# Patient Record
Sex: FEMALE | Race: WHITE | Employment: UNEMPLOYED | URBAN - METROPOLITAN AREA
[De-identification: names, ages, dates, MRNs, and addresses within clinical notes are randomized per-mention and may not be internally consistent; named-entity substitution may affect disease eponyms.]

---

## 2019-04-23 ENCOUNTER — HOSPITAL ENCOUNTER (OUTPATIENT)
Dept: BONE DENSITY | Age: 64
Discharge: HOME | End: 2019-04-23
Attending: FAMILY MEDICINE
Payer: COMMERCIAL

## 2019-04-23 ENCOUNTER — HOSPITAL ENCOUNTER (OUTPATIENT)
Dept: MAMMOGRAPHY | Age: 64
Discharge: HOME | End: 2019-04-23
Attending: FAMILY MEDICINE
Payer: COMMERCIAL

## 2019-04-23 DIAGNOSIS — Z12.31 ENCOUNTER FOR SCREENING MAMMOGRAM FOR MALIGNANT NEOPLASM OF BREAST: ICD-10-CM

## 2019-04-23 DIAGNOSIS — Z13.820 OSTEOPOROSIS SCREENING: ICD-10-CM

## 2019-04-23 PROCEDURE — 77063 BREAST TOMOSYNTHESIS BI: CPT | Performed by: FAMILY MEDICINE

## 2019-04-23 PROCEDURE — 77080 DXA BONE DENSITY AXIAL: CPT | Performed by: FAMILY MEDICINE

## 2019-04-23 PROCEDURE — 77067 SCR MAMMO BI INCL CAD: CPT | Performed by: FAMILY MEDICINE

## 2022-02-15 ENCOUNTER — HOSPITAL ENCOUNTER (OUTPATIENT)
Dept: MAMMOGRAPHY | Age: 67
Discharge: HOME | End: 2022-02-15
Attending: FAMILY MEDICINE
Payer: COMMERCIAL

## 2022-02-15 DIAGNOSIS — Z12.31 ENCOUNTER FOR SCREENING MAMMOGRAM FOR MALIGNANT NEOPLASM OF BREAST: ICD-10-CM

## 2022-02-15 PROCEDURE — 77067 SCR MAMMO BI INCL CAD: CPT | Performed by: INTERNAL MEDICINE

## 2022-02-15 PROCEDURE — 77063 BREAST TOMOSYNTHESIS BI: CPT | Performed by: INTERNAL MEDICINE

## 2024-11-06 ENCOUNTER — OFFICE VISIT (OUTPATIENT)
Dept: PHYSICAL MEDICINE AND REHAB | Facility: CLINIC | Age: 69
End: 2024-11-06
Payer: COMMERCIAL

## 2024-11-06 ENCOUNTER — HOSPITAL ENCOUNTER (OUTPATIENT)
Dept: GENERAL RADIOLOGY | Facility: HOSPITAL | Age: 69
Discharge: HOME | End: 2024-11-06
Attending: PHYSICAL MEDICINE & REHABILITATION
Payer: COMMERCIAL

## 2024-11-06 VITALS
WEIGHT: 140 LBS | BODY MASS INDEX: 24.8 KG/M2 | SYSTOLIC BLOOD PRESSURE: 128 MMHG | DIASTOLIC BLOOD PRESSURE: 58 MMHG | HEIGHT: 63 IN

## 2024-11-06 DIAGNOSIS — M75.41 ROTATOR CUFF IMPINGEMENT SYNDROME OF RIGHT SHOULDER: ICD-10-CM

## 2024-11-06 DIAGNOSIS — M67.911 TENDINOPATHY OF ROTATOR CUFF, RIGHT: ICD-10-CM

## 2024-11-06 DIAGNOSIS — M67.911 TENDINOPATHY OF ROTATOR CUFF, RIGHT: Primary | ICD-10-CM

## 2024-11-06 PROCEDURE — 73030 X-RAY EXAM OF SHOULDER: CPT | Performed by: PHYSICAL MEDICINE & REHABILITATION

## 2024-11-06 PROCEDURE — 99204 OFFICE O/P NEW MOD 45 MIN: CPT | Performed by: PHYSICAL MEDICINE & REHABILITATION

## 2024-11-06 NOTE — PROGRESS NOTES
[facility]  NEW PATIENT EVALUATION    HISTORY OF PRESENT ILLNESS:     Chief Complaint   Patient presents with    New Patient     New patient, right hand dominant here for right shoulder pain (doesn't radiate) that started on 09/22/24 after pounding some sticks with a hammer. Denies any numbness or tingling. Pain sharp and achy,  worst with laying on the right side and ROM. Denies any RX for pain. Pain rated 02/10.       The patient is a 68 year old female with no significant past medical history who presents with right shoulder pain.  Patient states that she was pounding stakes into the ground on September 27.  The next morning she felt a good amount of pain.  Pain is located along the posterolateral aspect.  She denies any radiating symptoms, any numbness tingling or weakness.  She notices the pain is worsened with movement of the shoulder with forward flexion or internal/external rotation.  Pain is currently rated 2 out of 10.  But initially the pain was more severe 6-7 out of 10.  Pain improved with modifying activities.  She was having a difficult time laying on initially.  She is not taking any medication.      PHYSICAL EXAM:   /58   Ht 63\"   Wt 140 lb (63.5 kg)   BMI 24.80 kg/m²     Gait: Normal    SHOULDER: Pertinent positives highlighted in bold    Inspection    Skin No evidence of eythema, warmth, bruising, abrasions, deformity or drooping about bilateral upper extremities   Atrophy No evidence of muscular atrophy   Range of Motion Decreased on the involved side   Forward Flexion 170   Abduction AROM 170   Adducted ER AROM 35   Internal Rotation T12   Palpation      Tenderness TTP of the greater tuberosity, bicipital groove, sternoclavicular joint, clavicle, acromioclavicular joint, coracoid and periscapular area   Crepitance Negative palpable/audible subacromial, glenohumeral, and scapulothoracic crepitance   Rotator Cuff    Strength 5/5 empty can;  5/5 ER; 5/5 IR   Impingement Positive Escobar  Positive Neers   ABD to 90 with resisted ER without pain (subacromial)  ABD to 90 with resisted IR without pain/weak (intra-articular)       Biceps/Labral Negative Speed's  Negative Winkler's   Negative Yergason      AC Joint Negative cross arm adduction     Stability Negative sulcus, AP translation, apprehension         IMAGING:     None    All imaging results were reviewed and discussed with patient.      ASSESSMENT/PLAN:     1. Tendinopathy of rotator cuff, right        Ashly leone 68-year-old female presenting today for evaluation of right shoulder pain with rotator cuff tendinopathy and impingement.  I recommended starting a PT program with home exercises and icing the shoulder daily.  Recommend Advil over-the-counter for the next 7 days.  Recommend follow-up in 4 weeks.  Recommend x-ray of the shoulder on the way out.  If pain is no better we will obtain MRI imaging for further evaluation and consider ultrasound-guided subacromial bursa injection.      The patient verbalized understanding with the plan and was in agreement. All questions/concerns were addressed and there were no barriers to learning.  Please note Dragon dictation software was used to dictate this note and can result in inadvertent typos.    Clarence Davies D.O. FAAPMR & CAQSM  Physical Medicine and Rehabilitation  Sports and Spine Medicine      PAST MEDICAL HISTORY:   History reviewed. No pertinent past medical history.      PAST SURGICAL HISTORY:   History reviewed. No pertinent surgical history.      CURRENT MEDICATIONS:     No current outpatient medications on file.         ALLERGIES:   Allergies[1]      FAMILY HISTORY:     Family History   Problem Relation Age of Onset    Diabetes Brother           SOCIAL HISTORY:     Social History     Socioeconomic History    Marital status:    Tobacco Use    Smoking status: Never    Smokeless tobacco: Never   Substance and Sexual Activity     Alcohol use: Not Currently    Drug use: Never   Other Topics Concern    Caffeine Concern No    Exercise No    Seat Belt No    Special Diet No    Stress Concern No    Weight Concern No          REVIEW OF SYSTEMS:   No patient-reported data collected this visit.       PHYSICAL EXAM:     General: No immediate distress  Head: Normocephalic/ Atraumatic  Eyes: Extra-occular movements intact.   Ears: No auricular hematoma or deformities  Mouth: No lesions or ulcerations  Heart: peripheral pulses intact. Normal capillary refill.   Lungs: Non-labored respirations  Abdomen: No abdominal guarding  Extremities: No lower extremity edema bilaterally   Skin: No lesions noted   Cognition: alert & oriented x 3, attentive, able to follow 2 step commands, comprehention intact, spontaneous speech intact  Psychiatric: Mood and affect appropriate    LABS:   No results found for: \"EAG\", \"A1C\"  No results found for: \"WBC\", \"RBC\", \"HGB\", \"HCT\", \"MCV\", \"MCH\", \"MCHC\", \"RDW\", \"PLT\", \"MPV\"  No results found for: \"GLU\", \"BUN\", \"BUNCREA\", \"CREATSERUM\", \"ANIONGAP\", \"GFR\", \"GFRNAA\", \"GFRAA\", \"CA\", \"OSMOCALC\", \"ALKPHO\", \"AST\", \"ALT\", \"ALKPHOS\", \"BILT\", \"TP\", \"ALB\", \"GLOBULIN\", \"AGRATIO\", \"NA\", \"K\", \"CL\", \"CO2\"  No results found for: \"PTP\", \"PT\", \"INR\"  No results found for: \"VITD\", \"QVITD\", \"[id number]\"           [1] Not on File

## 2024-11-06 NOTE — PATIENT INSTRUCTIONS
-Start physical therapy and home exercises  -Advil for the next 7 days  -Ice/Heat as tolerated  -Xray on the way out today  -Please stop the medication if you have any side effects and call the office if you have any questions or concerns  -If no better will consider MRI for further evaluation  -Follow up in 4 weeks

## 2024-11-25 ENCOUNTER — MED REC SCAN ONLY (OUTPATIENT)
Dept: PHYSICAL MEDICINE AND REHAB | Facility: CLINIC | Age: 69
End: 2024-11-25

## 2024-12-11 ENCOUNTER — OFFICE VISIT (OUTPATIENT)
Dept: PHYSICAL MEDICINE AND REHAB | Facility: CLINIC | Age: 69
End: 2024-12-11
Payer: COMMERCIAL

## 2024-12-11 VITALS — BODY MASS INDEX: 24.8 KG/M2 | WEIGHT: 140 LBS | HEIGHT: 63 IN

## 2024-12-11 DIAGNOSIS — M67.911 TENDINOPATHY OF ROTATOR CUFF, RIGHT: Primary | ICD-10-CM

## 2024-12-11 DIAGNOSIS — M75.41 ROTATOR CUFF IMPINGEMENT SYNDROME OF RIGHT SHOULDER: ICD-10-CM

## 2024-12-11 PROCEDURE — 99214 OFFICE O/P EST MOD 30 MIN: CPT | Performed by: PHYSICAL MEDICINE & REHABILITATION

## 2024-12-26 NOTE — PROGRESS NOTES
[facility]  FOLLOW UP EVALUATION    HISTORY OF PRESENT ILLNESS:     Chief Complaint   Patient presents with    Follow - Up     LOV 11/6/24. F/U for R shoulder pain. Pain 2/10. Denies N/T. Admits weakness. No pain medications. Pt starts PT on 12/12/24. XR R shoulder 11/6/24.        The patient is a 69 year old female with no significant past medical history who presents for follow-up evaluation of right shoulder pain.  She rates the pain to be 2 out of 10.  She denies any new numbness or tingling.  She is currently in physical therapy and doing home exercises.  She has noted improvement.      PHYSICAL EXAM:   Ht 63\"   Wt 140 lb (63.5 kg)   BMI 24.80 kg/m²     Gait: Normal    SHOULDER: Pertinent positives highlighted in bold    Inspection    Skin No evidence of eythema, warmth, bruising, abrasions, deformity or drooping about bilateral upper extremities   Atrophy No evidence of muscular atrophy   Range of Motion Decreased on the involved side   Forward Flexion 170   Abduction AROM 170   Adducted ER AROM 35   Internal Rotation T12   Palpation      Tenderness TTP of the greater tuberosity, bicipital groove, sternoclavicular joint, clavicle, acromioclavicular joint, coracoid and periscapular area   Crepitance Negative palpable/audible subacromial, glenohumeral, and scapulothoracic crepitance   Rotator Cuff    Strength 5/5 empty can; 5/5 ER; 5/5 IR   Impingement Positive Escobar  Positive Neers   ABD to 90 with resisted ER without pain (subacromial)  ABD to 90 with resisted IR without pain/weak (intra-articular)       Biceps/Labral Negative Speed's  Negative Brewster's   Negative Yergason      AC Joint Negative cross arm adduction     Stability Negative sulcus, AP translation, apprehension         IMAGING:   X-ray right shoulder completed 11/6/2024 was personally reviewed which is notable for mild degenerative changes with no acute osseous abnormality.    All imaging  results were reviewed and discussed with patient.      ASSESSMENT/PLAN:     1. Tendinopathy of rotator cuff, right    2. Rotator cuff impingement syndrome of right shoulder        Ashly leone 68-year-old female presenting today for follow-up evaluation of right shoulder pain with rotator cuff tendinopathy and impingement.  I have advised her to continue the PT program as she is noticing improvement.  She will follow-up in 4 weeks and if the pain persist we will consider ultrasound-guided subacromial bursa injection.  I have advised her to take over-the-counter NSAID as needed.    The patient verbalized understanding with the plan and was in agreement. All questions/concerns were addressed and there were no barriers to learning.  Please note Dragon dictation software was used to dictate this note and can result in inadvertent typos.    Clarence Davies D.O. FAAPMR & CAQSM  Physical Medicine and Rehabilitation  Sports and Spine Medicine      PAST MEDICAL HISTORY:   No past medical history on file.      PAST SURGICAL HISTORY:   No past surgical history on file.      CURRENT MEDICATIONS:     No current outpatient medications on file.         ALLERGIES:   Allergies[1]      FAMILY HISTORY:     Family History   Problem Relation Age of Onset    Diabetes Brother           SOCIAL HISTORY:     Social History     Socioeconomic History    Marital status:    Tobacco Use    Smoking status: Never    Smokeless tobacco: Never   Substance and Sexual Activity    Alcohol use: Not Currently    Drug use: Never   Other Topics Concern    Caffeine Concern No    Exercise No    Seat Belt No    Special Diet No    Stress Concern No    Weight Concern No          REVIEW OF SYSTEMS:   No patient-reported data collected this visit.       PHYSICAL EXAM:     General: No immediate distress  Head: Normocephalic/ Atraumatic  Eyes: Extra-occular movements intact.   Ears: No auricular hematoma or deformities  Mouth: No lesions or  ulcerations  Heart: peripheral pulses intact. Normal capillary refill.   Lungs: Non-labored respirations  Abdomen: No abdominal guarding  Extremities: No lower extremity edema bilaterally   Skin: No lesions noted   Cognition: alert & oriented x 3, attentive, able to follow 2 step commands, comprehention intact, spontaneous speech intact  Psychiatric: Mood and affect appropriate    LABS:   No results found for: \"EAG\", \"A1C\"  No results found for: \"WBC\", \"RBC\", \"HGB\", \"HCT\", \"MCV\", \"MCH\", \"MCHC\", \"RDW\", \"PLT\", \"MPV\"  No results found for: \"GLU\", \"BUN\", \"BUNCREA\", \"CREATSERUM\", \"ANIONGAP\", \"GFR\", \"GFRNAA\", \"GFRAA\", \"CA\", \"OSMOCALC\", \"ALKPHO\", \"AST\", \"ALT\", \"ALKPHOS\", \"BILT\", \"TP\", \"ALB\", \"GLOBULIN\", \"AGRATIO\", \"NA\", \"K\", \"CL\", \"CO2\"  No results found for: \"PTP\", \"PT\", \"INR\"  No results found for: \"VITD\", \"QVITD\", \"[id number]\"           [1] Not on File

## 2025-01-08 ENCOUNTER — OFFICE VISIT (OUTPATIENT)
Dept: PHYSICAL MEDICINE AND REHAB | Facility: CLINIC | Age: 70
End: 2025-01-08
Payer: COMMERCIAL

## 2025-01-08 VITALS — WEIGHT: 140 LBS | BODY MASS INDEX: 24.8 KG/M2 | HEIGHT: 63 IN

## 2025-01-08 DIAGNOSIS — M75.41 ROTATOR CUFF IMPINGEMENT SYNDROME OF RIGHT SHOULDER: ICD-10-CM

## 2025-01-08 DIAGNOSIS — M67.911 TENDINOPATHY OF ROTATOR CUFF, RIGHT: Primary | ICD-10-CM

## 2025-01-08 PROCEDURE — 99213 OFFICE O/P EST LOW 20 MIN: CPT | Performed by: PHYSICAL MEDICINE & REHABILITATION

## 2025-01-08 NOTE — PROGRESS NOTES
[facility]  FOLLOW UP EVALUATION    HISTORY OF PRESENT ILLNESS:     Chief Complaint   Patient presents with    Follow - Up     Patient here for follow up for right shoulder pain. Patient on PT and completed 4 sessions so far which were very helpful. Pain reported now 1/10, achy, worst when reaching up or putting pressure on the arm. Denies any numbness or tingling. Denies any medications for pain.      The patient is a 69 year old female with no significant past medical history who presents for follow-up evaluation of right shoulder pain.  She rates pain to be 1 out of 10.  She states pain is improved with PT and home exercises.  Not taking any medication for the pain at this time.  Overall she is happy with her progress      PHYSICAL EXAM:   Ht 63\"   Wt 140 lb (63.5 kg)   BMI 24.80 kg/m²     Gait: Normal    SHOULDER: Pertinent positives highlighted in bold    Inspection    Skin No evidence of eythema, warmth, bruising, abrasions, deformity or drooping about bilateral upper extremities   Atrophy No evidence of muscular atrophy   Range of Motion Decreased on the involved side   Forward Flexion 170   Abduction AROM 170   Adducted ER AROM 35   Internal Rotation T12   Palpation      Tenderness TTP of the greater tuberosity, bicipital groove, sternoclavicular joint, clavicle, acromioclavicular joint, coracoid and periscapular area   Crepitance Negative palpable/audible subacromial, glenohumeral, and scapulothoracic crepitance   Rotator Cuff    Strength 5/5 empty can; 5/5 ER; 5/5 IR   Impingement Positive Escobar  Positive Neers   ABD to 90 with resisted ER without pain (subacromial)  ABD to 90 with resisted IR without pain/weak (intra-articular)       Biceps/Labral Negative Speed's  Negative Casper's   Negative Yergason      AC Joint Negative cross arm adduction     Stability Negative sulcus, AP translation, apprehension         IMAGING:   X-ray right shoulder completed  11/6/2024 was personally reviewed which is notable for mild degenerative changes with no acute osseous abnormality.    All imaging results were reviewed and discussed with patient.      ASSESSMENT/PLAN:     1. Tendinopathy of rotator cuff, right    2. Rotator cuff impingement syndrome of right shoulder        Ashly leone 68-year-old female presenting today for follow-up evaluation of right shoulder pain with rotator cuff tendinopathy and impingement.  I have recommended continuing PT and home exercises and finishing the program and topical treatment with ice heat and Tylenol as needed.  Recommend she follow-up as needed in the future.    The patient verbalized understanding with the plan and was in agreement. All questions/concerns were addressed and there were no barriers to learning.  Please note Dragon dictation software was used to dictate this note and can result in inadvertent typos.    Clarence Davies D.O. FAAPMR & CAQSM  Physical Medicine and Rehabilitation  Sports and Spine Medicine      PAST MEDICAL HISTORY:   History reviewed. No pertinent past medical history.      PAST SURGICAL HISTORY:   History reviewed. No pertinent surgical history.      CURRENT MEDICATIONS:     No current outpatient medications on file.         ALLERGIES:   Allergies[1]      FAMILY HISTORY:     Family History   Problem Relation Age of Onset    Diabetes Brother           SOCIAL HISTORY:     Social History     Socioeconomic History    Marital status:    Tobacco Use    Smoking status: Never    Smokeless tobacco: Never   Substance and Sexual Activity    Alcohol use: Not Currently    Drug use: Never   Other Topics Concern    Caffeine Concern No    Exercise No    Seat Belt No    Special Diet No    Stress Concern No    Weight Concern No          REVIEW OF SYSTEMS:   No patient-reported data collected this visit.       PHYSICAL EXAM:     General: No immediate distress  Head: Normocephalic/ Atraumatic  Eyes: Extra-occular  movements intact.   Ears: No auricular hematoma or deformities  Mouth: No lesions or ulcerations  Heart: peripheral pulses intact. Normal capillary refill.   Lungs: Non-labored respirations  Abdomen: No abdominal guarding  Extremities: No lower extremity edema bilaterally   Skin: No lesions noted   Cognition: alert & oriented x 3, attentive, able to follow 2 step commands, comprehention intact, spontaneous speech intact  Psychiatric: Mood and affect appropriate    LABS:   No results found for: \"EAG\", \"A1C\"  No results found for: \"WBC\", \"RBC\", \"HGB\", \"HCT\", \"MCV\", \"MCH\", \"MCHC\", \"RDW\", \"PLT\", \"MPV\"  No results found for: \"GLU\", \"BUN\", \"BUNCREA\", \"CREATSERUM\", \"ANIONGAP\", \"GFR\", \"GFRNAA\", \"GFRAA\", \"CA\", \"OSMOCALC\", \"ALKPHO\", \"AST\", \"ALT\", \"ALKPHOS\", \"BILT\", \"TP\", \"ALB\", \"GLOBULIN\", \"AGRATIO\", \"NA\", \"K\", \"CL\", \"CO2\"  No results found for: \"PTP\", \"PT\", \"INR\"  No results found for: \"VITD\", \"QVITD\", \"[id number]\"           [1] Not on File